# Patient Record
Sex: FEMALE | Race: WHITE | Employment: OTHER | URBAN - METROPOLITAN AREA
[De-identification: names, ages, dates, MRNs, and addresses within clinical notes are randomized per-mention and may not be internally consistent; named-entity substitution may affect disease eponyms.]

---

## 2017-12-21 ENCOUNTER — HOSPITAL ENCOUNTER (OUTPATIENT)
Age: 72
Discharge: HOME | End: 2017-12-21
Payer: MEDICARE

## 2022-10-10 ENCOUNTER — OFFICE VISIT (OUTPATIENT)
Dept: UROLOGY | Age: 77
End: 2022-10-10
Payer: MEDICARE

## 2022-10-10 VITALS
RESPIRATION RATE: 16 BRPM | BODY MASS INDEX: 36.63 KG/M2 | DIASTOLIC BLOOD PRESSURE: 72 MMHG | WEIGHT: 194 LBS | SYSTOLIC BLOOD PRESSURE: 138 MMHG | OXYGEN SATURATION: 98 % | HEART RATE: 68 BPM | HEIGHT: 61 IN

## 2022-10-10 DIAGNOSIS — N28.9 KIDNEY DISEASE: Primary | ICD-10-CM

## 2022-10-10 PROCEDURE — 99203 OFFICE O/P NEW LOW 30 MIN: CPT | Performed by: UROLOGY

## 2022-10-10 PROCEDURE — 99212 OFFICE O/P EST SF 10 MIN: CPT | Performed by: UROLOGY

## 2022-10-10 PROCEDURE — 1123F ACP DISCUSS/DSCN MKR DOCD: CPT | Performed by: UROLOGY

## 2022-10-10 RX ORDER — ATORVASTATIN CALCIUM 40 MG/1
TABLET, FILM COATED ORAL
COMMUNITY

## 2022-10-10 RX ORDER — CANDESARTAN 16 MG/1
TABLET ORAL
COMMUNITY
Start: 2022-05-12

## 2022-10-10 RX ORDER — METOPROLOL TARTRATE 50 MG/1
50 TABLET, FILM COATED ORAL 2 TIMES DAILY
COMMUNITY
Start: 2021-12-09

## 2022-10-10 RX ORDER — LEVOTHYROXINE SODIUM 0.12 MG/1
TABLET ORAL
COMMUNITY

## 2022-10-10 NOTE — PROGRESS NOTES
Emmie Morton MD.    [place] [facility] [employer]  [address]  [address]  [place] [address]  Dept: [phone]  Dept Fax: [phone]    [facility] Urology Office Note -     Patient:  Carlos Dumas  YOB: 1945    The patient is a 69 y.o. female who presents today for evaluation of the following problems:   Chief Complaint   Patient presents with    New Patient    Chronic Kidney Disease    referred/consultation requested by Clifford Brooks MD.    History of Present Illness:    CKD  Pt has not seen nephrologist  No flank pain currently  No hx of kidney stones  No UTI's  No hematuria        Requested/reviewed records from Clifford Brooks MD office and/or outside physician/EMR    (Patient's old records have been requested, reviewed and pertinent findings summarized in today's note.)    Procedures Today: N/A      Last several PSA's:  No results found for: PSA    Last total testosterone:  No results found for: TESTOSTERONE    Urinalysis today:  No results found for this visit on 10/10/22. Last BUN and creatinine:  No results found for: BUN  No results found for: CREATININE      Imaging Reviewed during this Office Visit:   David Aburto MD independently reviewed the images and verified the radiology reports from:    No results found. PAST MEDICAL, FAMILY AND SOCIAL HISTORY:  Past Medical History:   Diagnosis Date    Hyperthyroidism     thyroid removed at age 26     Past Surgical History:   Procedure Laterality Date    BREAST BIOPSY      THYROID SURGERY      thyroid removed     No family history on file.   Outpatient Medications Marked as Taking for the 10/10/22 encounter (Office Visit) with Vazquez Catherine MD   Medication Sig Dispense Refill    metoprolol tartrate (LOPRESSOR) 50 MG tablet Take 50 mg by mouth 2 times daily      atorvastatin (LIPITOR) 40 MG tablet       candesartan (ATACAND) 16 MG tablet TAKE 1 TABLET DAILY      levothyroxine (SYNTHROID) 125 MCG tablet       CRANBERRY PO Take by mouth daily      CALCIUM PO Take by mouth daily      ASPIRIN 81 PO       Omega-3 Fatty Acids (FISH OIL PO)       MAGNESIUM GLYCINATE PO       Multiple Vitamins-Minerals (V-C FORTE PO)       albuterol (PROVENTIL HFA) 108 (90 BASE) MCG/ACT inhaler Inhale 2 puffs into the lungs every 6 hours as needed for Wheezing or Shortness of Breath. 1 Inhaler 3       Pcn [penicillins]  Social History     Tobacco Use   Smoking Status Never   Smokeless Tobacco Never      (If patient a smoker, smoking cessation counseling offered)   Social History     Substance and Sexual Activity   Alcohol Use None       REVIEW OF SYSTEMS:  Constitutional: negative  Eyes: negative  Respiratory: negative  Cardiovascular: negative  Gastrointestinal: negative  Genitourinary: see HPI  Musculoskeletal: negative  Skin: negative   Neurological: negative  Hematological/Lymphatic: negative  Psychological: negative        Physical Exam:    This a 69 y.o. female  Vitals:    10/10/22 0854   BP: 138/72   Pulse: 68   Resp: 16   SpO2: 98%     Body mass index is 36.66 kg/m². Constitutional: Patient in no acute distress;       Assessment and Plan        1. Kidney disease               Plan:      Recommend renal u/s to rule out hydronephrosis  UA with micro  Needs referral to nephrology asap    PRN      Prescriptions Ordered:  No orders of the defined types were placed in this encounter. Orders Placed:  No orders of the defined types were placed in this encounter.            Karena Sen MD

## 2022-10-14 ENCOUNTER — HOSPITAL ENCOUNTER (OUTPATIENT)
Dept: ULTRASOUND IMAGING | Age: 77
Discharge: HOME | End: 2022-10-16
Payer: MEDICARE

## 2022-10-14 DIAGNOSIS — N28.9 KIDNEY DISEASE: ICD-10-CM

## 2022-10-14 PROCEDURE — 76775 US EXAM ABDO BACK WALL LIM: CPT

## 2022-10-18 ENCOUNTER — PROCEDURE VISIT (OUTPATIENT)
Dept: SURGERY | Age: 77
End: 2022-10-18
Payer: MEDICARE

## 2022-10-18 VITALS
HEART RATE: 84 BPM | WEIGHT: 198 LBS | DIASTOLIC BLOOD PRESSURE: 82 MMHG | HEIGHT: 61 IN | BODY MASS INDEX: 37.38 KG/M2 | SYSTOLIC BLOOD PRESSURE: 132 MMHG

## 2022-10-18 DIAGNOSIS — R22.32 MASS OF LEFT HAND: Primary | ICD-10-CM

## 2022-10-18 PROCEDURE — 99203 OFFICE O/P NEW LOW 30 MIN: CPT | Performed by: SURGERY

## 2022-10-18 PROCEDURE — 99212 OFFICE O/P EST SF 10 MIN: CPT | Performed by: SURGERY

## 2022-10-18 PROCEDURE — 1123F ACP DISCUSS/DSCN MKR DOCD: CPT | Performed by: SURGERY

## 2022-10-18 NOTE — PROGRESS NOTES
Deana Browning is a 69 y.o. female who presents today for a 3 to 4-month history of a new swelling/mass of the palm of the left hand. Patient reports that over the past 3 to 4 months she has noticed that she developed a very small what she thought was a cyst in the skin of the palm of the left hand. Initially it was not causing her any issues but over this past 3 to 4 months it seems that the initial lesion has increased in size and now she has developed a similar lesion just proximal to the initial lesion. She states that it is hard and is palpable. She states that she initially noticed it just by feeling her hand but over the past 3 to 4 months she has begun to notice that she does have a little pressure when she tries to fully extend the fingers of that hand. Has not noticed any inability to fully extend her fingers and has not noticed any catching or inability to flex or extend the fingers of the hand. She was seen by her PCP and was told that this may be a ganglion cyst and was given the option to see a surgeon or dermatology. The patient decided to see a surgeon and she was referred to me. On further questioning denies any prior issues. Has not noticed any redness, drainage from the skin in this area or other signs of infection.     Past Medical History:   Diagnosis Date    Hyperthyroidism     thyroid removed at age 26       Past Surgical History:   Procedure Laterality Date    BREAST BIOPSY      THYROID SURGERY      thyroid removed       Current Outpatient Medications   Medication Sig Dispense Refill    metoprolol tartrate (LOPRESSOR) 50 MG tablet Take 50 mg by mouth 2 times daily      atorvastatin (LIPITOR) 40 MG tablet       candesartan (ATACAND) 16 MG tablet TAKE 1 TABLET DAILY      levothyroxine (SYNTHROID) 125 MCG tablet       CRANBERRY PO Take by mouth daily      CALCIUM PO Take by mouth daily      ASPIRIN 81 PO       Omega-3 Fatty Acids (FISH OIL PO)       MAGNESIUM GLYCINATE PO Multiple Vitamins-Minerals (V-C FORTE PO)        No current facility-administered medications for this visit. Allergies   Allergen Reactions    Pcn [Penicillins] Rash       History reviewed. No pertinent family history. Social History     Socioeconomic History    Marital status:      Spouse name: Not on file    Number of children: Not on file    Years of education: Not on file    Highest education level: Not on file   Occupational History    Not on file   Tobacco Use    Smoking status: Never    Smokeless tobacco: Never   Substance and Sexual Activity    Alcohol use: Not on file    Drug use: No    Sexual activity: Not on file   Other Topics Concern    Not on file   Social History Narrative    Not on file     Social Determinants of Health     Financial Resource Strain: Not on file   Food Insecurity: Not on file   Transportation Needs: Not on file   Physical Activity: Not on file   Stress: Not on file   Social Connections: Not on file   Intimate Partner Violence: Not on file   Housing Stability: Not on file       ROS:   Review of Systems - General ROS: negative  Psychological ROS: negative  Ophthalmic ROS: negative  ENT ROS: negative  Respiratory ROS: no cough, shortness of breath, or wheezing  Cardiovascular ROS: no chest pain or dyspnea on exertion  Gastrointestinal ROS: no abdominal pain, change in bowel habits, or black or bloody stools  Genito-Urinary ROS: no dysuria, trouble voiding, or hematuria  Musculoskeletal ROS: negative  Dermatological ROS: negative      Objective   Vitals:    10/18/22 0823   BP: 132/82   Pulse: 84     General:in no apparent distress and well developed and well nourished  Eyes: No gross abnormalities. Ears, Nose, Throat: hearing grossly normal bilaterally  Neck: neck supple and non tender without mass  Lungs: clear to auscultation without wheezes or rales   Heart: S1S2, no mumurs, RRR  Abdomen: soft, nontender, no HSM, no guarding, no rebound, no masses  Extremity:  In the palm of the left hand proximal to the MCP joint of the fourth digit there is a palpable knot that seems to involve the skin as well as some deeper structures and it almost appears that the skin in that area has been bunched and scarred in the place. Does have the appearance of possible Dupuytren's contracture. Is able to move the fourth digit in full motion with both passive and active flexion and extension. She does report with passive and active extension of the fourth digit that she does feel some pulling in the area of the swelling. There is no catching during range of motion to suggest trigger finger. Neuro: CN II-XII grossly intact      1. Mass of left hand  Assessment & Plan: This point based on exam and the history I have concerned that this may represent Dupuytren's contracture versus a small cyst in a tendon of the palm. I think Dupuytren's contracture is more likely just based on the appearance. I discussed my concerns with the patient and she voices understanding. Unfortunately I do not offer hand surgery and do not treat Dupuytren's contracture. I have offered to make referral to hand specialist for further evaluation. She is willing to proceed with that referral.  Referral placed. May follow-up with PCP and the hand surgeon. No further follow-up with general surgery needed.   Orders:  -     Jed Costa, Allyssa Naranjo MD, Orthopedic Surgery, [place]         (Please note that portions of this note were completed with a voice recognition program.  Efforts were made to edit the dictations but occasionally words are mis-transcribed.)

## 2022-10-18 NOTE — ASSESSMENT & PLAN NOTE
This point based on exam and the history I have concerned that this may represent Dupuytren's contracture versus a small cyst in a tendon of the palm. I think Dupuytren's contracture is more likely just based on the appearance. I discussed my concerns with the patient and she voices understanding. Unfortunately I do not offer hand surgery and do not treat Dupuytren's contracture. I have offered to make referral to hand specialist for further evaluation. She is willing to proceed with that referral.  Referral placed. May follow-up with PCP and the hand surgeon. No further follow-up with general surgery needed.

## 2022-10-21 DIAGNOSIS — N28.9 KIDNEY DISEASE: Primary | ICD-10-CM

## 2022-11-02 DIAGNOSIS — M79.642 LEFT HAND PAIN: Primary | ICD-10-CM

## 2022-11-09 ENCOUNTER — OFFICE VISIT (OUTPATIENT)
Dept: ORTHOPEDIC SURGERY | Age: 77
End: 2022-11-09
Payer: MEDICARE

## 2022-11-09 ENCOUNTER — HOSPITAL ENCOUNTER (OUTPATIENT)
Dept: GENERAL RADIOLOGY | Age: 77
Discharge: HOME | End: 2022-11-11
Payer: MEDICARE

## 2022-11-09 VITALS
SYSTOLIC BLOOD PRESSURE: 136 MMHG | BODY MASS INDEX: 38.87 KG/M2 | HEART RATE: 57 BPM | WEIGHT: 198 LBS | DIASTOLIC BLOOD PRESSURE: 76 MMHG | OXYGEN SATURATION: 96 % | HEIGHT: 60 IN

## 2022-11-09 DIAGNOSIS — M79.642 LEFT HAND PAIN: ICD-10-CM

## 2022-11-09 DIAGNOSIS — R22.32 MASS OF LEFT HAND: ICD-10-CM

## 2022-11-09 DIAGNOSIS — M79.642 LEFT HAND PAIN: Primary | ICD-10-CM

## 2022-11-09 PROCEDURE — 73130 X-RAY EXAM OF HAND: CPT

## 2022-11-09 PROCEDURE — 99214 OFFICE O/P EST MOD 30 MIN: CPT | Performed by: ORTHOPAEDIC SURGERY

## 2022-11-09 PROCEDURE — 1123F ACP DISCUSS/DSCN MKR DOCD: CPT | Performed by: ORTHOPAEDIC SURGERY

## 2022-11-09 PROCEDURE — 99203 OFFICE O/P NEW LOW 30 MIN: CPT | Performed by: ORTHOPAEDIC SURGERY

## 2022-11-09 NOTE — PROGRESS NOTES
History and Physical    Patient's Name/Date of Birth: Fredo Riddle / 1945, 69 y.o. yo    Date: November 9, 2022     PCP: Isai Koroma, JAMES, APRN - CNP     History of Present Illness: This 79-year-old right-hand-dominant female presents with several month history of a pulling sensation to her left ring finger with no associated significant pain or discomfort. She reports no history of injury or trauma. Family history negative    Past Medical History:   Diagnosis Date    Hyperthyroidism     thyroid removed at age 26      Past Surgical History:   Procedure Laterality Date    BREAST BIOPSY      THYROID SURGERY      thyroid removed      Allergies: Pcn [penicillins]     Current Outpatient Medications   Medication Sig Dispense Refill    Bioflavonoid Products (BIOFLEX PO) Take by mouth 2 tablets in am      metoprolol tartrate (LOPRESSOR) 50 MG tablet Take 50 mg by mouth 2 times daily      atorvastatin (LIPITOR) 40 MG tablet       candesartan (ATACAND) 16 MG tablet TAKE 1 TABLET DAILY      levothyroxine (SYNTHROID) 125 MCG tablet       CRANBERRY PO Take by mouth daily      CALCIUM PO Take by mouth daily      ASPIRIN 81 PO       Omega-3 Fatty Acids (FISH OIL PO)       MAGNESIUM GLYCINATE PO       Multiple Vitamins-Minerals (V-C FORTE PO)        No current facility-administered medications for this visit. Social History     Tobacco Use    Smoking status: Never    Smokeless tobacco: Never   Substance Use Topics    Alcohol use: Not on file        Review of Systems:  Negative  Other than stated above in the HPI is negative      Physical exam:     General appearance: no acute distress  Head: NAD  Neck: supple  Lungs: No audible wheezing, respiratory rate normal  Heart: Perfusion to all extremeties  Extremities: Examination of her left hand revealed thickening fascia along the course of the left ring finger. She was nontender on palpation. She demonstrated full motion of the digit.   She did not have any associated contractures. She was assessed to be neurovascular intact. Radiographs have been obtained and reviewed noting no underlying acute or chronic pathology. Assessment:  Patient presents with early signs of Dupuytren's disease to the left palm. No associated contracture. Plan:  Recommendation at this time was expectant management. I did indicate the patient had progressive contractures she may be seen in my clinic at [facility] for further evaluation and treatment. No orders of the defined types were placed in this encounter.               Radha Emerson MD,MD 11/9/2022 at 12:43 PM

## 2022-11-29 ENCOUNTER — HOSPITAL ENCOUNTER (OUTPATIENT)
Dept: ULTRASOUND IMAGING | Age: 77
Discharge: HOME | End: 2022-12-01

## 2022-11-29 DIAGNOSIS — N28.9 KIDNEY DISEASE: ICD-10-CM

## 2022-12-05 ENCOUNTER — OFFICE VISIT (OUTPATIENT)
Dept: UROLOGY | Age: 77
End: 2022-12-05
Payer: MEDICARE

## 2022-12-05 VITALS
BODY MASS INDEX: 39.46 KG/M2 | WEIGHT: 201 LBS | OXYGEN SATURATION: 97 % | DIASTOLIC BLOOD PRESSURE: 64 MMHG | SYSTOLIC BLOOD PRESSURE: 122 MMHG | HEART RATE: 59 BPM | HEIGHT: 60 IN | RESPIRATION RATE: 16 BRPM

## 2022-12-05 DIAGNOSIS — N13.30 BILATERAL HYDRONEPHROSIS: Primary | ICD-10-CM

## 2022-12-05 PROCEDURE — 99213 OFFICE O/P EST LOW 20 MIN: CPT | Performed by: UROLOGY

## 2022-12-05 PROCEDURE — 1123F ACP DISCUSS/DSCN MKR DOCD: CPT | Performed by: UROLOGY

## 2022-12-05 NOTE — PROGRESS NOTES
Kasia Herr MD.    [place] [facility]  [address]  [address] [id number]  Dept: [phone]  Dept Fax: [phone]    [facility] Urology Office Note -     Patient:  Chelly Morfin  YOB: 1945    The patient is a 69 y.o. female who presents today for evaluation of the following problems:   Chief Complaint   Patient presents with    Chronic Kidney Disease     Review US- mild bilateral hydronephrosis, possible kidney stone left? referred/consultation requested by JAMES Guerra, APRN - CNP. History of Present Illness:    CKD  Pt has not seen nephrologist  No flank pain currently  No hx of kidney stones  No UTI's  No hematuria  Renal u/s mild bl hydronephrosis        Requested/reviewed records from JAMES Guerra, APRN - CNP office and/or outside physician/EMR    (Patient's old records have been requested, reviewed and pertinent findings summarized in today's note.)    Procedures Today: N/A      Last several PSA's:  No results found for: PSA    Last total testosterone:  No results found for: TESTOSTERONE    Urinalysis today:  No results found for this visit on 12/05/22. Last BUN and creatinine:  No results found for: BUN  No results found for: CREATININE      Imaging Reviewed during this Office Visit:   imaging independently reviewed by Caitie Topete MD and radiology report verified demonstrating     No results found. PAST MEDICAL, FAMILY AND SOCIAL HISTORY:  Past Medical History:   Diagnosis Date    Hyperthyroidism     thyroid removed at age 26     Past Surgical History:   Procedure Laterality Date    BREAST BIOPSY      THYROID SURGERY      thyroid removed     No family history on file.   Outpatient Medications Marked as Taking for the 12/5/22 encounter (Office Visit) with Guera Reynaga MD   Medication Sig [address] [facility] PO) Take by mouth 2 tablets in am      metoprolol tartrate (LOPRESSOR) 50 MG tablet Take 50 mg by mouth 2 times daily      atorvastatin (LIPITOR) 40 MG tablet       candesartan (ATACAND) 16 MG tablet TAKE 1 TABLET DAILY      levothyroxine (SYNTHROID) 125 MCG tablet       CRANBERRY PO Take by mouth daily      CALCIUM PO Take by mouth daily      ASPIRIN 81 PO       Omega-3 Fatty Acids (FISH OIL PO)       MAGNESIUM GLYCINATE PO       Multiple Vitamins-Minerals (V-C FORTE PO)          Pcn [penicillins]  Social History     Tobacco Use   Smoking Status Never   Smokeless Tobacco Never      (If patient a smoker, smoking cessation counseling offered)   Social History     Substance and Sexual Activity   Alcohol Use None       REVIEW OF SYSTEMS:  Constitutional: negative  Eyes: negative  Respiratory: negative  Cardiovascular: negative  Gastrointestinal: negative  Genitourinary: see HPI  Musculoskeletal: negative  Skin: negative   Neurological: negative  Hematological/Lymphatic: negative  Psychological: negative        Physical Exam:    This a 69 y.o. female  Vitals:    12/05/22 1133   BP: 122/64   Pulse: 59   Resp: 16   SpO2: 97%     Body mass index is 39.26 kg/m². Constitutional: Patient in no acute distress;       Assessment and Plan        1. Bilateral hydronephrosis                 Plan:      Bilateral hydronephrosis- repeat u/s in 2-3 months to assess for resolution. Currently asymptomatic. Kidney stone- small nonobstructing. Prescriptions Ordered:  No orders of the defined types were placed in this encounter. Orders Placed:  No orders of the defined types were placed in this encounter.            Court Rogers MD

## 2023-03-22 ENCOUNTER — HOSPITAL ENCOUNTER (OUTPATIENT)
Age: 78
Discharge: HOME | End: 2023-03-22
Payer: MEDICARE

## 2023-03-22 ENCOUNTER — OFFICE VISIT (OUTPATIENT)
Dept: NEPHROLOGY | Age: 78
End: 2023-03-22
Payer: MEDICARE

## 2023-03-22 VITALS
HEART RATE: 60 BPM | DIASTOLIC BLOOD PRESSURE: 78 MMHG | BODY MASS INDEX: 38.48 KG/M2 | WEIGHT: 196 LBS | SYSTOLIC BLOOD PRESSURE: 128 MMHG | HEIGHT: 60 IN

## 2023-03-22 DIAGNOSIS — N18.31 STAGE 3A CHRONIC KIDNEY DISEASE (HCC): Primary | ICD-10-CM

## 2023-03-22 DIAGNOSIS — I10 HYPERTENSION, UNSPECIFIED TYPE: ICD-10-CM

## 2023-03-22 DIAGNOSIS — N13.30 BILATERAL HYDRONEPHROSIS: ICD-10-CM

## 2023-03-22 DIAGNOSIS — N20.0 NEPHROLITHIASIS: ICD-10-CM

## 2023-03-22 DIAGNOSIS — N18.31 STAGE 3A CHRONIC KIDNEY DISEASE (HCC): ICD-10-CM

## 2023-03-22 LAB
BACTERIA: ABNORMAL
BILIRUBIN URINE: NEGATIVE
COLOR: YELLOW
COMPLEMENT C3: 185 MG/DL (ref 90–180)
COMPLEMENT C4: 32 MG/DL (ref 10–40)
CREATININE URINE: 218.3 MG/DL (ref 28–217)
EPITHELIAL CELLS UA: ABNORMAL /HPF (ref 0–5)
FREE KAPPA/LAMBDA RATIO: 0.96 (ref 0.26–1.65)
GLUCOSE UR STRIP.AUTO-MCNC: ABNORMAL MG/DL
KAPPA LC FREE SER-MCNC: 1.17 MG/DL (ref 0.37–1.94)
KETONES UR STRIP.AUTO-MCNC: NEGATIVE MG/DL
LAMBDA LC FREE SERPL-MCNC: 1.22 MG/DL (ref 0.57–2.63)
LEUKOCYTE ESTERASE UR QL STRIP.AUTO: ABNORMAL
MICROALBUMIN/CREAT 24H UR: 242 MG/L
MICROALBUMIN/CREAT UR-RTO: 111 MCG/MG CREAT
NITRITE UR QL STRIP.AUTO: NEGATIVE
PROT UR STRIP.AUTO-MCNC: 6 MG/DL (ref 5–6)
PROT UR STRIP.AUTO-MCNC: ABNORMAL MG/DL
RBC CLUMPS #/AREA URNS AUTO: ABNORMAL /HPF (ref 0–4)
SPECIFIC GRAVITY UA: 1.02 (ref 1.01–1.02)
TURBIDITY: ABNORMAL
URINE HGB: ABNORMAL
UROBILINOGEN, URINE: NORMAL
WBC UA: ABNORMAL /HPF (ref 0–4)

## 2023-03-22 PROCEDURE — 87088 URINE BACTERIA CULTURE: CPT

## 2023-03-22 PROCEDURE — 3078F DIAST BP <80 MM HG: CPT | Performed by: INTERNAL MEDICINE

## 2023-03-22 PROCEDURE — 86225 DNA ANTIBODY NATIVE: CPT

## 2023-03-22 PROCEDURE — 83521 IG LIGHT CHAINS FREE EACH: CPT

## 2023-03-22 PROCEDURE — 83516 IMMUNOASSAY NONANTIBODY: CPT

## 2023-03-22 PROCEDURE — 84155 ASSAY OF PROTEIN SERUM: CPT

## 2023-03-22 PROCEDURE — 99204 OFFICE O/P NEW MOD 45 MIN: CPT | Performed by: INTERNAL MEDICINE

## 2023-03-22 PROCEDURE — 82570 ASSAY OF URINE CREATININE: CPT

## 2023-03-22 PROCEDURE — 86334 IMMUNOFIX E-PHORESIS SERUM: CPT

## 2023-03-22 PROCEDURE — G8427 DOCREV CUR MEDS BY ELIG CLIN: HCPCS | Performed by: INTERNAL MEDICINE

## 2023-03-22 PROCEDURE — G8417 CALC BMI ABV UP PARAM F/U: HCPCS | Performed by: INTERNAL MEDICINE

## 2023-03-22 PROCEDURE — 1090F PRES/ABSN URINE INCON ASSESS: CPT | Performed by: INTERNAL MEDICINE

## 2023-03-22 PROCEDURE — 86038 ANTINUCLEAR ANTIBODIES: CPT

## 2023-03-22 PROCEDURE — G8400 PT W/DXA NO RESULTS DOC: HCPCS | Performed by: INTERNAL MEDICINE

## 2023-03-22 PROCEDURE — 82088 ASSAY OF ALDOSTERONE: CPT

## 2023-03-22 PROCEDURE — 1036F TOBACCO NON-USER: CPT | Performed by: INTERNAL MEDICINE

## 2023-03-22 PROCEDURE — 82043 UR ALBUMIN QUANTITATIVE: CPT

## 2023-03-22 PROCEDURE — G8484 FLU IMMUNIZE NO ADMIN: HCPCS | Performed by: INTERNAL MEDICINE

## 2023-03-22 PROCEDURE — 36415 COLL VENOUS BLD VENIPUNCTURE: CPT

## 2023-03-22 PROCEDURE — 87086 URINE CULTURE/COLONY COUNT: CPT

## 2023-03-22 PROCEDURE — 87186 SC STD MICRODIL/AGAR DIL: CPT

## 2023-03-22 PROCEDURE — 81001 URINALYSIS AUTO W/SCOPE: CPT

## 2023-03-22 PROCEDURE — 86160 COMPLEMENT ANTIGEN: CPT

## 2023-03-22 PROCEDURE — 84244 ASSAY OF RENIN: CPT

## 2023-03-22 PROCEDURE — 1123F ACP DISCUSS/DSCN MKR DOCD: CPT | Performed by: INTERNAL MEDICINE

## 2023-03-22 PROCEDURE — 3074F SYST BP LT 130 MM HG: CPT | Performed by: INTERNAL MEDICINE

## 2023-03-22 PROCEDURE — 84165 PROTEIN E-PHORESIS SERUM: CPT

## 2023-03-22 NOTE — PROGRESS NOTES
hours.  BNP:    No results found for: BNP  NEVIN:    No results found for: NEVIN  SPEP:No results found for: PROT, ALBCAL, ALBCAL, ALBPCT, LABALPH, A1PCT, LABALPH, A2PCT, LABBETA, BETAPCT, GAMGLOB, GGPCT, PATH  UPEP:   No results found for: LABPE  C3:   No results found for: C3  C4:   No results found for: C4  MPO ANCA:   No results found for: MPO  PR3 ANCA:   No results found for: PR3  Anti-GBM:   No results found for: GBMABIGG  Hep BsAg:       No results found for: HEPBSAG  Hep C AB:        No results found for: HEPCAB    Urinalysis/Chemistries:    No results found for: NITRU, COLORU, PHUR, LABCAST, WBCUA, RBCUA, MUCUS, TRICHOMONAS, YEAST, BACTERIA, CLARITYU, SPECGRAV, LEUKOCYTESUR, UROBILINOGEN, BILIRUBINUR, BLOODU, GLUCOSEU, KETUA, AMORPHOUS  Urine Sodium:   No results found for: KATIE  Urine Potassium:  No results found for: KUR  Urine Chloride:  No results found for: CLUR  Urine Osmolarity: No results found for: OSMOU  Urine Protein:   No results found for: TPU  Urine Creatinine:   No results found for: LABCREA  UPC:     Urine Eosinophils:  No components found for: UEOS    Radiology:     CXR:     Assessment:     1. Chronic kidney disease stage IIIa appears to be present for at least the last couple of years. Latest lab work 12/7/2022 showed normal electrolytes and BUN 19 and creatinine 1.12 for estimated GFR of 51 mL/min. Of note, the patient has been on candesartan for at least the last 6 to 8 years per patient documentation and that may have contributed to a rising creatinine that has persisted over time. Other potential etiologies such as paraprotein disease vasculitides connective tissue disease and glomerulonephritis need to undergo evaluation. 2.  Hypertension with no previous evaluation for secondary causes  3. History of a normal CBC in September 2022  4. Reported history of a heart valve leak by the patient and sees Ignacio Rojas CNP for cardiology  5.   Mild bilateral hydronephrosis with a

## 2023-03-24 LAB
ALBUMIN (CALCULATED): 4.2 G/DL (ref 3.2–5.2)
ALBUMIN PERCENT: 62 % (ref 45–65)
ALPHA 1 PERCENT: 2 % (ref 3–6)
ALPHA 2 PERCENT: 14 % (ref 6–13)
ALPHA1 GLOB SERPL ELPH-MCNC: 0.2 G/DL (ref 0.1–0.4)
ALPHA2 GLOB SERPL ELPH-MCNC: 0.9 G/DL (ref 0.5–0.9)
ANA SER QL IA: NEGATIVE
ANCA MYELOPEROXIDASE: <0.3 AU/ML (ref 0–3.5)
ANCA PROTEINASE 3: <0.7 AU/ML (ref 0–2)
B-GLOBULIN SERPL ELPH-MCNC: 0.7 G/DL (ref 0.5–1.1)
BETA PERCENT: 11 % (ref 11–19)
DSDNA IGG SER QL IA: <0.5 IU/ML
GAMMA GLOB SERPL ELPH-MCNC: 0.7 G/DL (ref 0.5–1.5)
GAMMA GLOBULIN %: 11 % (ref 9–20)
MICROORGANISM SPEC CULT: ABNORMAL
NUCLEAR IGG SER IA-RTO: 0.6 U/ML
PATHOLOGIST: ABNORMAL
PATHOLOGIST: NORMAL
PROT SERPL-MCNC: 6.7 G/DL (ref 6.4–8.3)
PROTEIN ELECTROPHORESIS, SERUM: ABNORMAL
SERUM IFX INTERP: NORMAL
SPECIMEN DESCRIPTION: ABNORMAL
TOTAL PROT. SUM,%: 100 % (ref 98–102)
TOTAL PROT. SUM: 6.7 G/DL (ref 6.3–8.2)

## 2023-03-25 LAB
ALDOSTERONE COMMENT: NORMAL
ALDOSTERONE: 17.5 NG/DL

## 2023-03-26 LAB
RENIN ACTIVITY: 13 NG/ML/HR
RENIN COMMENT: NORMAL

## 2023-04-03 ENCOUNTER — HOSPITAL ENCOUNTER (OUTPATIENT)
Dept: INTERVENTIONAL RADIOLOGY/VASCULAR | Age: 78
Discharge: HOME | End: 2023-04-05
Payer: MEDICARE

## 2023-04-03 PROCEDURE — 76770 US EXAM ABDO BACK WALL COMP: CPT

## 2023-05-04 ENCOUNTER — HOSPITAL ENCOUNTER (OUTPATIENT)
Dept: CT IMAGING | Age: 78
Discharge: HOME | End: 2023-05-06
Payer: MEDICARE

## 2023-05-04 DIAGNOSIS — N13.30 BILATERAL HYDRONEPHROSIS: ICD-10-CM

## 2023-05-04 PROCEDURE — 74176 CT ABD & PELVIS W/O CONTRAST: CPT

## 2023-05-08 ENCOUNTER — TELEPHONE (OUTPATIENT)
Dept: UROLOGY | Age: 78
End: 2023-05-08

## 2023-05-08 NOTE — TELEPHONE ENCOUNTER
Samantha Ramirez phoned very apologetic for missing forgetting todays ajppointment. She was wondering if she at some point could do a virtual visit to discuss ct scan results that were to elissa jensen discussed today. She did mention she will see Dr. Kya Huber 9-. I did not reschedule her appointment at the time of the call. I told her a nurse would call her back later today.

## 2023-05-11 NOTE — PROGRESS NOTES
[place] [facility] [employer]  [address]  [address]  [place] [address]  Dept: [phone]    Visit Date: 5/12/2023        HPI:     Myra Baugh is a 69 y.o. female who presents today for:  Chief Complaint   Patient presents with    Kidney Problem     F/u hydronephrosis       HPI  Patient presents to urology clinic for kidney stone and CKD. CKD  No flank pain currently  No hx of kidney stones  No recent UTU, previous E. Coli  No hematuria  Renal u/s worsening bl hydronephrosis on recheck- left 1.7cm renal stone on CT    Current Outpatient Medications   Medication Sig Dispense Refill    Bioflavonoid Products (BIOFLEX PO) Take by mouth 2 tablets in am      metoprolol tartrate (LOPRESSOR) 50 MG tablet Take 1 tablet by mouth 2 times daily      atorvastatin (LIPITOR) 40 MG tablet       candesartan (ATACAND) 16 MG tablet TAKE 1 TABLET DAILY      levothyroxine (SYNTHROID) 125 MCG tablet       CRANBERRY PO Take by mouth daily      CALCIUM PO Take by mouth daily      ASPIRIN 81 PO       Omega-3 Fatty Acids (FISH OIL PO)       MAGNESIUM GLYCINATE PO       Multiple Vitamins-Minerals (V-C FORTE PO)        No current facility-administered medications for this visit. Past Medical History  Isac Cornelius  has a past medical history of Hyperthyroidism. Past Surgical History  The patient  has a past surgical history that includes Thyroid surgery and Breast biopsy. Family History  This patient's family history is not on file. Social History  Isac Cornelius  reports that she has never smoked. She has never used smokeless tobacco. She reports that she does not use drugs.       Subjective:      REVIEW OF SYSTEMS:  Constitutional: negative  Eyes: negative  Respiratory: negative  Cardiovascular: negative  Gastrointestinal: negative  Musculoskeletal: negative  Genitourinary: negative except for what is in HPI  Skin: negative

## 2023-05-12 ENCOUNTER — OFFICE VISIT (OUTPATIENT)
Dept: UROLOGY | Age: 78
End: 2023-05-12
Payer: MEDICARE

## 2023-05-12 VITALS
BODY MASS INDEX: 38.08 KG/M2 | HEIGHT: 60 IN | SYSTOLIC BLOOD PRESSURE: 132 MMHG | DIASTOLIC BLOOD PRESSURE: 78 MMHG | OXYGEN SATURATION: 97 % | HEART RATE: 60 BPM

## 2023-05-12 DIAGNOSIS — N20.0 NEPHROLITHIASIS: ICD-10-CM

## 2023-05-12 DIAGNOSIS — N13.30 BILATERAL HYDRONEPHROSIS: Primary | ICD-10-CM

## 2023-05-12 DIAGNOSIS — N28.9 KIDNEY DISEASE: ICD-10-CM

## 2023-05-12 PROCEDURE — G8400 PT W/DXA NO RESULTS DOC: HCPCS | Performed by: NURSE PRACTITIONER

## 2023-05-12 PROCEDURE — G8417 CALC BMI ABV UP PARAM F/U: HCPCS | Performed by: NURSE PRACTITIONER

## 2023-05-12 PROCEDURE — 1036F TOBACCO NON-USER: CPT | Performed by: NURSE PRACTITIONER

## 2023-05-12 PROCEDURE — 99214 OFFICE O/P EST MOD 30 MIN: CPT | Performed by: NURSE PRACTITIONER

## 2023-05-12 PROCEDURE — 1123F ACP DISCUSS/DSCN MKR DOCD: CPT | Performed by: NURSE PRACTITIONER

## 2023-05-12 PROCEDURE — 1090F PRES/ABSN URINE INCON ASSESS: CPT | Performed by: NURSE PRACTITIONER

## 2023-05-12 PROCEDURE — G8427 DOCREV CUR MEDS BY ELIG CLIN: HCPCS | Performed by: NURSE PRACTITIONER

## 2023-05-19 ENCOUNTER — TELEPHONE (OUTPATIENT)
Dept: UROLOGY | Age: 78
End: 2023-05-19

## 2023-05-19 DIAGNOSIS — Z01.818 PRE-OP TESTING: Primary | ICD-10-CM

## 2023-05-22 NOTE — TELEPHONE ENCOUNTER
Pre-op cxr and ekg orders placed. Pt. Was moved to 6/12/23. Patient voiced understanding will come in for testing.

## 2023-05-24 ENCOUNTER — HOSPITAL ENCOUNTER (OUTPATIENT)
Dept: NON INVASIVE DIAGNOSTICS | Age: 78
Discharge: HOME | End: 2023-05-24
Payer: MEDICARE

## 2023-05-24 ENCOUNTER — OFFICE VISIT (OUTPATIENT)
Dept: NEPHROLOGY | Age: 78
End: 2023-05-24
Payer: MEDICARE

## 2023-05-24 ENCOUNTER — HOSPITAL ENCOUNTER (OUTPATIENT)
Dept: GENERAL RADIOLOGY | Age: 78
Discharge: HOME | End: 2023-05-26
Payer: MEDICARE

## 2023-05-24 VITALS
HEIGHT: 60 IN | BODY MASS INDEX: 37.11 KG/M2 | HEART RATE: 64 BPM | SYSTOLIC BLOOD PRESSURE: 128 MMHG | WEIGHT: 189 LBS | DIASTOLIC BLOOD PRESSURE: 82 MMHG

## 2023-05-24 DIAGNOSIS — N20.0 NEPHROLITHIASIS: ICD-10-CM

## 2023-05-24 DIAGNOSIS — R80.9 MICROALBUMINURIA: ICD-10-CM

## 2023-05-24 DIAGNOSIS — I10 PRIMARY HYPERTENSION: ICD-10-CM

## 2023-05-24 DIAGNOSIS — Z01.818 PRE-OP TESTING: ICD-10-CM

## 2023-05-24 DIAGNOSIS — N18.31 STAGE 3A CHRONIC KIDNEY DISEASE (HCC): Primary | ICD-10-CM

## 2023-05-24 DIAGNOSIS — D89.2 PARAPROTEINEMIA: ICD-10-CM

## 2023-05-24 LAB
EKG ATRIAL RATE: 58 BPM
EKG P AXIS: 52 DEGREES
EKG P-R INTERVAL: 162 MS
EKG Q-T INTERVAL: 460 MS
EKG QRS DURATION: 132 MS
EKG QTC CALCULATION (BAZETT): 451 MS
EKG R AXIS: 96 DEGREES
EKG T AXIS: -26 DEGREES
EKG VENTRICULAR RATE: 58 BPM

## 2023-05-24 PROCEDURE — 1036F TOBACCO NON-USER: CPT | Performed by: INTERNAL MEDICINE

## 2023-05-24 PROCEDURE — G8427 DOCREV CUR MEDS BY ELIG CLIN: HCPCS | Performed by: INTERNAL MEDICINE

## 2023-05-24 PROCEDURE — G8400 PT W/DXA NO RESULTS DOC: HCPCS | Performed by: INTERNAL MEDICINE

## 2023-05-24 PROCEDURE — 1123F ACP DISCUSS/DSCN MKR DOCD: CPT | Performed by: INTERNAL MEDICINE

## 2023-05-24 PROCEDURE — 71045 X-RAY EXAM CHEST 1 VIEW: CPT

## 2023-05-24 PROCEDURE — 93005 ELECTROCARDIOGRAM TRACING: CPT

## 2023-05-24 PROCEDURE — 3074F SYST BP LT 130 MM HG: CPT | Performed by: INTERNAL MEDICINE

## 2023-05-24 PROCEDURE — 3078F DIAST BP <80 MM HG: CPT | Performed by: INTERNAL MEDICINE

## 2023-05-24 PROCEDURE — 1090F PRES/ABSN URINE INCON ASSESS: CPT | Performed by: INTERNAL MEDICINE

## 2023-05-24 PROCEDURE — 99214 OFFICE O/P EST MOD 30 MIN: CPT | Performed by: INTERNAL MEDICINE

## 2023-05-24 PROCEDURE — G8417 CALC BMI ABV UP PARAM F/U: HCPCS | Performed by: INTERNAL MEDICINE

## 2023-05-24 NOTE — PROGRESS NOTES
Renal Consult Note    Patient :  Bandar Zelaya; 69 y.o. MRN# [id number]    Reason for Consult:     Asked by Dr Marisol lundberg. providers found to see for JOSE/Elevated Creatinine. History of Present Illness:     Bandar Zelaya; 69 y.o. female with past medical history as mentioned below. She is at an elevated serum creatinine for at least the past couple of years. She does note that she had an uncontrolled hypertension episode about 6 to 8 years ago. She was started on medications for blood pressure by Dr. Leeanna Huntley. No significant change in medications since that time. The patient is on candesartan and metoprolol for her blood pressure control. It is also noteworthy that she sees Inge Dumas nurse practitioner for cardiology. She has had a history of a \"valve leak\". It is unclear what valve and to what extent. The nurse practitioner put her on fark [employer]. However, the patient had previously been been put on [address] for her kidney issue but stopped it because of a significant vaginal infection. I would say if the patient does get another infection of significance she might have to stop the [address] permanently. She has a past medical history of hypertension. She also has a history of bilateral hydronephrosis which is mild and a history of a small nonobstructing kidney stone. The evaluation for the hydronephrosis led to a referral to nephrology. She also had hyperthyroidism resulting in thyroid removal at the age of 26. She has had a history of bilateral tubal ligation. She says she has 3 children who are relatively healthy but have had some autoimmune issues. She had a daughter who had high thyroid and utilized iodine. Allergies are to penicillin which causes a rash. Medications include Farxiga, BIOflex, Lopressor, Lipitor, candesartan, Synthroid, oral cranberry, oral calcium, 81 mg aspirin, fish oil, magnesium and multivitamin.     Social history includes the patient is  with her  in

## 2023-06-06 ENCOUNTER — TELEPHONE (OUTPATIENT)
Dept: UROLOGY | Age: 78
End: 2023-06-06

## 2023-06-07 NOTE — TELEPHONE ENCOUNTER
Cardiac clearance scanned in under media.
(LOPRESSOR) 50 MG tablet Take 1 tablet by mouth 2 times daily 12/9/21   Historical Provider, MD   atorvastatin (LIPITOR) 40 MG tablet     Historical Provider, MD   candesartan (ATACAND) 16 MG tablet TAKE 1 TABLET DAILY 5/12/22   Historical Provider, MD   levothyroxine (SYNTHROID) 125 MCG tablet     Historical Provider, MD   CRANBERRY PO Take by mouth daily    Historical Provider, MD   CALCIUM PO Take by mouth daily    Historical Provider, MD   ASPIRIN 81 PO     Historical Provider, MD   Omega-3 Fatty Acids (FISH OIL PO)     Historical Provider, MD   MAGNESIUM GLYCINATE PO     Historical Provider, MD   Multiple Vitamins-Minerals (V-C FORTE PO)     Historical Provider, MD     Vital Signs (Current) [unfilled]    Weight:   Wt Readings from Last 1 Encounters:   05/24/23 189 lb (85.7 kg)     Height:   Ht Readings from Last 1 Encounters:   05/24/23 5' (1.524 m)      BMI:  There is no height or weight on file to calculate BMI. Estimated body mass index is 36.91 kg/m² as calculated from the following:    Height as of 5/24/23: 5' (1.524 m). Weight as of 5/24/23: 189 lb (85.7 kg). body mass index is unknown because there is no height or weight on file. Cardiac Clearance:  DALJIT Deshpande   Medical Clearance: Lc Chakraborty     Appointment for surgery Clearance scheduled for:None     Preoperative Testing: These are the current and completed labs:  CBC: No results found for: WBC, RBC, HGB, HCT, MCV, RDW, PLT  CMP:   Lab Results   Component Value Date/Time    PROT 6.7 03/22/2023 02:57 PM     POC Tests: No results for input(s): POCGLU, POCNA, POCK, POCCL, POCBUN, POCHEMO, POCHCT in the last 72 hours.   Coags  No results found for: PROTIME, INR, APTT  HCG (If Applicable) No results found for: PREGTESTUR, PREGSERUM, HCG, HCGQUANT   ABGs No results found for: PHART, PO2ART, TXM4BXC, IUT1HRO, BEART, G6MBIJXM   Type & Screen (If Applicable)  No results found for: LABABO, LABRH    Additional ordered pre-operative

## 2023-07-10 ENCOUNTER — PROCEDURE VISIT (OUTPATIENT)
Dept: UROLOGY | Age: 78
End: 2023-07-10
Payer: MEDICARE

## 2023-07-10 VITALS
WEIGHT: 198 LBS | BODY MASS INDEX: 38.87 KG/M2 | DIASTOLIC BLOOD PRESSURE: 72 MMHG | HEART RATE: 76 BPM | SYSTOLIC BLOOD PRESSURE: 136 MMHG | HEIGHT: 60 IN

## 2023-07-10 DIAGNOSIS — N20.0 NEPHROLITHIASIS: ICD-10-CM

## 2023-07-10 DIAGNOSIS — N13.30 BILATERAL HYDRONEPHROSIS: Primary | ICD-10-CM

## 2023-07-10 PROCEDURE — 52310 CYSTOSCOPY AND TREATMENT: CPT | Performed by: UROLOGY

## 2023-07-10 NOTE — PROGRESS NOTES
Cystoscopy with left stent removal    Operative Note    Patient:  Elaina Laws  MRN: [id number]  YOB: 1945    Date: 07/10/23  Surgeon: Waldo Dutton MD  Anesthesia: Urojet Local  Indications: kidney stone  Position: Dorsal Lithotomy  EBL: 0 ml    Findings:   The patient was prepped and draped in the usual sterile fashion. The cystoscope was advanced through the urethra and into the bladder. The bladder was thoroughly inspected and the following was noted:    Vagina: normal appearing vagina with normal color and discharge, no lesions  Residual Urine: mild. Urine clear, with no obvious infection  Urethra: normal appearing urethra with no masses, tenderness or lesions urethral hypermobility not noted  Bladder: no tumor noted . no bladder diverticulum. Mild trabeculation noted. Ureters: Orifices with normal configuration and location. left stent was removed with graspers in it's entirety    The cystoscope was removed. The patient tolerated the procedure well.   Flomax or ditropan could have caused a reaction  Pt did have fever after surgery  Family upset that pt was discharged too early    3 months litholink and kub w brock

## 2023-07-10 NOTE — PROGRESS NOTES
[address] [place] [address] E number [id number]; Serial Number [id number] scope #2 used for procedure today, was removed from sterile container after visual inspection.

## 2023-08-08 DIAGNOSIS — N20.0 NEPHROLITHIASIS: ICD-10-CM

## 2023-08-15 ENCOUNTER — HOSPITAL ENCOUNTER (OUTPATIENT)
Dept: GENERAL RADIOLOGY | Age: 78
Discharge: HOME | End: 2023-08-17
Payer: MEDICARE

## 2023-08-15 DIAGNOSIS — N20.0 NEPHROLITHIASIS: ICD-10-CM

## 2023-08-15 PROCEDURE — 74018 RADEX ABDOMEN 1 VIEW: CPT

## 2023-08-22 NOTE — PROGRESS NOTES
[facility] [address]  [address] [facility] [address]  Dept: [phone]    Visit Date: 8/25/2023        HPI:     Donald Medina is a 69 y.o. female who presents today for:  Chief Complaint   Patient presents with    Nephrolithiasis     1 month with kub and litholink,        HPI  Patient presents to urology clinic for post-operative follow-up. Catherine Ron is s/p cystoscopy, left ureteroscopy left holmium laser lithotripsy  left stent placement by Dr. Belinda Martinez on 6/12/23. She is doing very well. Denies flank pain, suprapubic pressure, gross hematuria, dysuria, fever or chills. Litholink reviewed- low urine citrate. KUB negative. No urinary urgency, frequency or incontinence reported. Previous records:  CKD  No flank pain currently  No hx of kidney stones  No recent UTU, previous E. Coli  No hematuria  Renal u/s worsening bl hydronephrosis on recheck- left 1.7cm renal stone on CT    Current Outpatient Medications   Medication Sig Dispense Refill    potassium citrate (UROCIT-K) 10 MEQ (1080 MG) extended release tablet Take 1 tablet by mouth 2 times daily (with meals) 180 tablet 4    Bioflavonoid Products (BIOFLEX PO) Take by mouth 2 tablets in am      metoprolol tartrate (LOPRESSOR) 50 MG tablet Take 1 tablet by mouth 2 times daily      atorvastatin (LIPITOR) 40 MG tablet       candesartan (ATACAND) 16 MG tablet TAKE 1 TABLET DAILY      levothyroxine (SYNTHROID) 125 MCG tablet       CRANBERRY PO Take by mouth daily      CALCIUM PO Take by mouth daily      ASPIRIN 81 PO       Omega-3 Fatty Acids (FISH OIL PO)       MAGNESIUM GLYCINATE PO       Multiple Vitamins-Minerals (V-C FORTE PO)        No current facility-administered medications for this visit. Past Medical History  Catherine Ron  has a past medical history of Hyperthyroidism.     Past Surgical History  The patient  has a past surgical history

## 2023-08-25 ENCOUNTER — OFFICE VISIT (OUTPATIENT)
Dept: UROLOGY | Age: 78
End: 2023-08-25
Payer: MEDICARE

## 2023-08-25 VITALS
HEIGHT: 60 IN | SYSTOLIC BLOOD PRESSURE: 108 MMHG | BODY MASS INDEX: 38.67 KG/M2 | HEART RATE: 59 BPM | DIASTOLIC BLOOD PRESSURE: 60 MMHG

## 2023-08-25 DIAGNOSIS — N20.0 NEPHROLITHIASIS: Primary | ICD-10-CM

## 2023-08-25 DIAGNOSIS — N13.30 BILATERAL HYDRONEPHROSIS: ICD-10-CM

## 2023-08-25 PROCEDURE — G8400 PT W/DXA NO RESULTS DOC: HCPCS | Performed by: NURSE PRACTITIONER

## 2023-08-25 PROCEDURE — 99213 OFFICE O/P EST LOW 20 MIN: CPT | Performed by: NURSE PRACTITIONER

## 2023-08-25 PROCEDURE — 1123F ACP DISCUSS/DSCN MKR DOCD: CPT | Performed by: NURSE PRACTITIONER

## 2023-08-25 PROCEDURE — 1090F PRES/ABSN URINE INCON ASSESS: CPT | Performed by: NURSE PRACTITIONER

## 2023-08-25 PROCEDURE — G8427 DOCREV CUR MEDS BY ELIG CLIN: HCPCS | Performed by: NURSE PRACTITIONER

## 2023-08-25 PROCEDURE — G8417 CALC BMI ABV UP PARAM F/U: HCPCS | Performed by: NURSE PRACTITIONER

## 2023-08-25 PROCEDURE — 1036F TOBACCO NON-USER: CPT | Performed by: NURSE PRACTITIONER

## 2023-08-25 RX ORDER — POTASSIUM CITRATE 10 MEQ/1
10 TABLET, EXTENDED RELEASE ORAL 2 TIMES DAILY WITH MEALS
Qty: 180 TABLET | Refills: 4 | Status: SHIPPED | OUTPATIENT
Start: 2023-08-25 | End: 2023-11-23

## 2023-10-30 ENCOUNTER — HOSPITAL ENCOUNTER (OUTPATIENT)
Age: 78
Discharge: HOME | End: 2023-10-30
Payer: MEDICARE

## 2023-10-30 DIAGNOSIS — I10 PRIMARY HYPERTENSION: ICD-10-CM

## 2023-10-30 DIAGNOSIS — D89.2 PARAPROTEINEMIA: ICD-10-CM

## 2023-10-30 DIAGNOSIS — N20.0 NEPHROLITHIASIS: ICD-10-CM

## 2023-10-30 DIAGNOSIS — N18.31 STAGE 3A CHRONIC KIDNEY DISEASE (HCC): ICD-10-CM

## 2023-10-30 LAB
ANION GAP SERPL CALCULATED.3IONS-SCNC: 7 MMOL/L (ref 9–17)
BUN SERPL-MCNC: 20 MG/DL (ref 8–23)
BUN/CREAT SERPL: 22 (ref 9–20)
CALCIUM SERPL-MCNC: 9.4 MG/DL (ref 8.6–10.4)
CHLORIDE SERPL-SCNC: 105 MMOL/L (ref 98–107)
CO2 SERPL-SCNC: 31 MMOL/L (ref 20–31)
CREAT SERPL-MCNC: 0.9 MG/DL (ref 0.5–0.9)
CREAT UR-MCNC: 107.5 MG/DL (ref 28–217)
ERYTHROCYTE [DISTWIDTH] IN BLOOD BY AUTOMATED COUNT: 14.2 % (ref 11.8–14.4)
GFR SERPL CREATININE-BSD FRML MDRD: >60 ML/MIN/1.73M2
GLUCOSE SERPL-MCNC: 99 MG/DL (ref 70–99)
HCT VFR BLD AUTO: 41.3 % (ref 36.3–47.1)
HGB BLD-MCNC: 12.8 G/DL (ref 11.9–15.1)
MCH RBC QN AUTO: 28.9 PG (ref 25.2–33.5)
MCHC RBC AUTO-ENTMCNC: 31 G/DL (ref 25.2–33.5)
MCV RBC AUTO: 93.2 FL (ref 82.6–102.9)
MICROALBUMIN UR-MCNC: 69 MG/L
MICROALBUMIN/CREAT UR-RTO: 64 MCG/MG CREAT
NRBC BLD-RTO: 0 PER 100 WBC
PLATELET # BLD AUTO: 253 K/UL (ref 138–453)
PMV BLD AUTO: 10.4 FL (ref 8.1–13.5)
POTASSIUM SERPL-SCNC: 4.9 MMOL/L (ref 3.7–5.3)
RBC # BLD AUTO: 4.43 M/UL (ref 3.95–5.11)
SODIUM SERPL-SCNC: 143 MMOL/L (ref 135–144)
WBC OTHER # BLD: 6.9 K/UL (ref 3.5–11.3)

## 2023-10-30 PROCEDURE — 82043 UR ALBUMIN QUANTITATIVE: CPT

## 2023-10-30 PROCEDURE — 84165 PROTEIN E-PHORESIS SERUM: CPT

## 2023-10-30 PROCEDURE — 82570 ASSAY OF URINE CREATININE: CPT

## 2023-10-30 PROCEDURE — 84155 ASSAY OF PROTEIN SERUM: CPT

## 2023-10-30 PROCEDURE — 85027 COMPLETE CBC AUTOMATED: CPT

## 2023-10-30 PROCEDURE — 86334 IMMUNOFIX E-PHORESIS SERUM: CPT

## 2023-10-30 PROCEDURE — 80048 BASIC METABOLIC PNL TOTAL CA: CPT

## 2023-10-30 PROCEDURE — 36415 COLL VENOUS BLD VENIPUNCTURE: CPT

## 2023-11-01 LAB
ALBUMIN PERCENT: 60 % (ref 45–65)
ALBUMIN SERPL-MCNC: 3.8 G/DL (ref 3.2–5.2)
ALPHA 2 PERCENT: 15 % (ref 6–13)
ALPHA1 GLOB SERPL ELPH-MCNC: 0.2 G/DL (ref 0.1–0.4)
ALPHA1 GLOB SERPL ELPH-MCNC: 3 % (ref 3–6)
ALPHA2 GLOB SERPL ELPH-MCNC: 0.9 G/DL (ref 0.5–0.9)
B-GLOBULIN SERPL ELPH-MCNC: 0.7 G/DL (ref 0.5–1.1)
B-GLOBULIN SERPL ELPH-MCNC: 10 % (ref 11–19)
GAMMA GLOB SERPL ELPH-MCNC: 0.8 G/DL (ref 0.5–1.5)
GAMMA GLOBULIN %: 12 % (ref 9–20)
INTERPRETATION SERPL IFE-IMP: NORMAL
PATH REV: NORMAL
PATHOLOGIST: ABNORMAL
PROT PATTERN SERPL ELPH-IMP: ABNORMAL
PROT SERPL-MCNC: 6.4 G/DL (ref 6.4–8.3)
TOTAL PROT. SUM,%: 100 % (ref 98–102)
TOTAL PROT. SUM: 6.4 G/DL (ref 6.3–8.2)

## 2023-11-22 ENCOUNTER — OFFICE VISIT (OUTPATIENT)
Dept: NEPHROLOGY | Age: 78
End: 2023-11-22
Payer: MEDICARE

## 2023-11-22 VITALS
WEIGHT: 180 LBS | HEART RATE: 58 BPM | HEIGHT: 60 IN | SYSTOLIC BLOOD PRESSURE: 126 MMHG | BODY MASS INDEX: 35.34 KG/M2 | DIASTOLIC BLOOD PRESSURE: 76 MMHG

## 2023-11-22 DIAGNOSIS — N20.0 NEPHROLITHIASIS: ICD-10-CM

## 2023-11-22 DIAGNOSIS — D89.2 PARAPROTEINEMIA: Primary | ICD-10-CM

## 2023-11-22 DIAGNOSIS — R80.9 MICROALBUMINURIA: ICD-10-CM

## 2023-11-22 DIAGNOSIS — N18.2 CKD (CHRONIC KIDNEY DISEASE), STAGE II: ICD-10-CM

## 2023-11-22 DIAGNOSIS — I10 PRIMARY HYPERTENSION: ICD-10-CM

## 2023-11-22 PROCEDURE — 3078F DIAST BP <80 MM HG: CPT | Performed by: INTERNAL MEDICINE

## 2023-11-22 PROCEDURE — G8427 DOCREV CUR MEDS BY ELIG CLIN: HCPCS | Performed by: INTERNAL MEDICINE

## 2023-11-22 PROCEDURE — 99212 OFFICE O/P EST SF 10 MIN: CPT | Performed by: INTERNAL MEDICINE

## 2023-11-22 PROCEDURE — 1036F TOBACCO NON-USER: CPT | Performed by: INTERNAL MEDICINE

## 2023-11-22 PROCEDURE — 1090F PRES/ABSN URINE INCON ASSESS: CPT | Performed by: INTERNAL MEDICINE

## 2023-11-22 PROCEDURE — G8400 PT W/DXA NO RESULTS DOC: HCPCS | Performed by: INTERNAL MEDICINE

## 2023-11-22 PROCEDURE — G8417 CALC BMI ABV UP PARAM F/U: HCPCS | Performed by: INTERNAL MEDICINE

## 2023-11-22 PROCEDURE — 99213 OFFICE O/P EST LOW 20 MIN: CPT | Performed by: INTERNAL MEDICINE

## 2023-11-22 PROCEDURE — 1123F ACP DISCUSS/DSCN MKR DOCD: CPT | Performed by: INTERNAL MEDICINE

## 2023-11-22 PROCEDURE — G8484 FLU IMMUNIZE NO ADMIN: HCPCS | Performed by: INTERNAL MEDICINE

## 2023-11-22 PROCEDURE — 3074F SYST BP LT 130 MM HG: CPT | Performed by: INTERNAL MEDICINE

## 2023-11-22 NOTE — PROGRESS NOTES
accessory muscle use. HEENT: Atraumatic, normocephalic, moist mucosa. Eyes:   No scleral icterus, normal conjunctiva, EOMI  Neck:   No JVD, midline trachea, no accessory muscle use  Chest:   Good bilateral air entry and clear to auscultation bilaterally without wheezes or rales or rhonchi  Cardiac:  Regular rate and rhythm with positive S1 and S2 and no rubs  Abdomen: Soft and non-tender and non-distended with active bowel sounds  :   No suprapubic or flank tenderness. Neuro:   AAO x 3   SKIN:  No rashes, good skin turgor. Extremities:  No edema, palpable peripheral pulses, no calf tenderness. Labs:     No results for input(s): \"WBC\", \"RBC\", \"HGB\", \"HCT\", \"MCV\", \"MCH\", \"MCHC\", \"RDW\", \"PLT\", \"MPV\" in the last 72 hours. BMP: No results for input(s): \"NA\", \"K\", \"CL\", \"CO2\", \"BUN\", \"CREATININE\", \"GLUCOSE\", \"CALCIUM\" in the last 72 hours. Phosphorus:   No results for input(s): \"PHOS\" in the last 72 hours. Magnesium:  No results for input(s): \"MG\" in the last 72 hours. Albumin:  No results for input(s): \"LABALBU\" in the last 72 hours. BNP:    No results found for: \"BNP\"  NEVIN:      Lab Results   Component Value Date/Time    NEVIN NEGATIVE 03/22/2023 02:57 PM     SPEP:  Lab Results   Component Value Date/Time    PROT 6.4 10/30/2023 09:29 AM    ALBCAL 3.8 10/30/2023 09:29 AM    ALBPCT 60 10/30/2023 09:29 AM    LABALPH 0.2 10/30/2023 09:29 AM    LABALPH 0.9 10/30/2023 09:29 AM    A1PCT 3 10/30/2023 09:29 AM    A2PCT 15 10/30/2023 09:29 AM    BETAPCT 10 10/30/2023 09:29 AM    GAMGLOB 0.8 10/30/2023 09:29 AM    GGPCT 12 10/30/2023 09:29 AM    PATH ELECTRONICALLY SIGNED.  Valerie Powell M.D. 10/30/2023 09:29 AM     UPEP:   No results found for: \"LABPE\"  C3:     Lab Results   Component Value Date/Time    C3 185 03/22/2023 02:57 PM     C4:     Lab Results   Component Value Date/Time    C4 32 03/22/2023 02:57 PM     MPO ANCA:     Lab Results   Component Value Date/Time    MPO <0.3 03/22/2023 02:57 PM     PR3 ANCA:

## 2024-05-08 ENCOUNTER — HOSPITAL ENCOUNTER (OUTPATIENT)
Dept: GENERAL RADIOLOGY | Age: 79
Discharge: HOME | End: 2024-05-10
Payer: MEDICARE

## 2024-05-08 DIAGNOSIS — N20.0 NEPHROLITHIASIS: ICD-10-CM

## 2024-05-08 PROCEDURE — 74018 RADEX ABDOMEN 1 VIEW: CPT

## 2024-05-09 NOTE — PROGRESS NOTES
IMPRESSION:  No calculus could be identified overlying the renal shadows.    Assessment/Plan:   1. Nephrolithiasis  - KUB negative.   - Offered annual observation with KUB. Patient wishes to follow-up as needed.     -Patient has no other questions, comments, or concerns.   -They agree with and understand the plan of care.   -The patient was encouraged to call the office or seek emergency care should this change.      Iwona Davey, APRN - CNP

## 2024-05-10 ENCOUNTER — OFFICE VISIT (OUTPATIENT)
Dept: UROLOGY | Age: 79
End: 2024-05-10
Payer: MEDICARE

## 2024-05-10 VITALS
HEIGHT: 60 IN | BODY MASS INDEX: 35.15 KG/M2 | HEART RATE: 78 BPM | DIASTOLIC BLOOD PRESSURE: 70 MMHG | SYSTOLIC BLOOD PRESSURE: 122 MMHG

## 2024-05-10 DIAGNOSIS — N20.0 NEPHROLITHIASIS: Primary | ICD-10-CM

## 2024-05-10 PROCEDURE — G8427 DOCREV CUR MEDS BY ELIG CLIN: HCPCS | Performed by: NURSE PRACTITIONER

## 2024-05-10 PROCEDURE — 99213 OFFICE O/P EST LOW 20 MIN: CPT | Performed by: NURSE PRACTITIONER

## 2024-05-10 PROCEDURE — 1036F TOBACCO NON-USER: CPT | Performed by: NURSE PRACTITIONER

## 2024-05-10 PROCEDURE — 1123F ACP DISCUSS/DSCN MKR DOCD: CPT | Performed by: NURSE PRACTITIONER

## 2024-05-10 PROCEDURE — G8417 CALC BMI ABV UP PARAM F/U: HCPCS | Performed by: NURSE PRACTITIONER

## 2024-05-10 PROCEDURE — G8400 PT W/DXA NO RESULTS DOC: HCPCS | Performed by: NURSE PRACTITIONER

## 2024-05-10 PROCEDURE — 1090F PRES/ABSN URINE INCON ASSESS: CPT | Performed by: NURSE PRACTITIONER

## 2024-05-10 RX ORDER — SACUBITRIL AND VALSARTAN 24; 26 MG/1; MG/1
1 TABLET, FILM COATED ORAL 2 TIMES DAILY
COMMUNITY

## 2024-05-10 RX ORDER — SPIRONOLACTONE 25 MG/1
12.5 TABLET ORAL DAILY
COMMUNITY
Start: 2024-04-26

## 2024-05-20 ENCOUNTER — HOSPITAL ENCOUNTER (OUTPATIENT)
Age: 79
Discharge: HOME | End: 2024-05-20
Payer: MEDICARE

## 2024-05-20 DIAGNOSIS — R80.9 MICROALBUMINURIA: ICD-10-CM

## 2024-05-20 DIAGNOSIS — D89.2 PARAPROTEINEMIA: ICD-10-CM

## 2024-05-20 DIAGNOSIS — N20.0 NEPHROLITHIASIS: ICD-10-CM

## 2024-05-20 DIAGNOSIS — N18.2 CKD (CHRONIC KIDNEY DISEASE), STAGE II: ICD-10-CM

## 2024-05-20 DIAGNOSIS — I10 PRIMARY HYPERTENSION: ICD-10-CM

## 2024-05-20 LAB
ALBUMIN SERPL-MCNC: 4.3 G/DL (ref 3.5–5.2)
ANION GAP SERPL CALCULATED.3IONS-SCNC: 11 MMOL/L (ref 9–17)
BASOPHILS # BLD: 0.05 K/UL (ref 0–0.2)
BASOPHILS NFR BLD: 1 % (ref 0–2)
BUN SERPL-MCNC: 22 MG/DL (ref 8–23)
BUN/CREAT SERPL: 18 (ref 9–20)
CALCIUM SERPL-MCNC: 9.2 MG/DL (ref 8.6–10.4)
CHLORIDE SERPL-SCNC: 104 MMOL/L (ref 98–107)
CO2 SERPL-SCNC: 26 MMOL/L (ref 20–31)
CREAT SERPL-MCNC: 1.2 MG/DL (ref 0.5–0.9)
CREAT UR-MCNC: 133 MG/DL (ref 28–217)
EOSINOPHIL # BLD: 0.16 K/UL (ref 0–0.44)
EOSINOPHILS RELATIVE PERCENT: 2 % (ref 1–4)
ERYTHROCYTE [DISTWIDTH] IN BLOOD BY AUTOMATED COUNT: 15.1 % (ref 11.8–14.4)
GFR, ESTIMATED: 47 ML/MIN/1.73M2
GLUCOSE SERPL-MCNC: 100 MG/DL (ref 70–99)
HCT VFR BLD AUTO: 47.5 % (ref 36.3–47.1)
HGB BLD-MCNC: 15.4 G/DL (ref 11.9–15.1)
IMM GRANULOCYTES # BLD AUTO: <0.03 K/UL (ref 0–0.3)
IMM GRANULOCYTES NFR BLD: 0 %
LYMPHOCYTES NFR BLD: 2.76 K/UL (ref 1.1–3.7)
LYMPHOCYTES RELATIVE PERCENT: 36 % (ref 24–43)
MCH RBC QN AUTO: 29.9 PG (ref 25.2–33.5)
MCHC RBC AUTO-ENTMCNC: 32.4 G/DL (ref 25.2–33.5)
MCV RBC AUTO: 92.2 FL (ref 82.6–102.9)
MICROALBUMIN UR-MCNC: 49 MG/L (ref 0–20)
MICROALBUMIN/CREAT UR-RTO: 37 MCG/MG CREAT (ref 0–25)
MONOCYTES NFR BLD: 0.48 K/UL (ref 0.1–1.2)
MONOCYTES NFR BLD: 6 % (ref 3–12)
NEUTROPHILS NFR BLD: 55 % (ref 36–65)
NEUTS SEG NFR BLD: 4.21 K/UL (ref 1.5–8.1)
NRBC BLD-RTO: 0 PER 100 WBC
PLATELET # BLD AUTO: 233 K/UL (ref 138–453)
PMV BLD AUTO: 11.3 FL (ref 8.1–13.5)
POTASSIUM SERPL-SCNC: 4.6 MMOL/L (ref 3.7–5.3)
RBC # BLD AUTO: 5.15 M/UL (ref 3.95–5.11)
RBC # BLD: ABNORMAL 10*6/UL
SODIUM SERPL-SCNC: 141 MMOL/L (ref 135–144)
WBC OTHER # BLD: 7.7 K/UL (ref 3.5–11.3)

## 2024-05-20 PROCEDURE — 86334 IMMUNOFIX E-PHORESIS SERUM: CPT

## 2024-05-20 PROCEDURE — 82043 UR ALBUMIN QUANTITATIVE: CPT

## 2024-05-20 PROCEDURE — 82040 ASSAY OF SERUM ALBUMIN: CPT

## 2024-05-20 PROCEDURE — 84165 PROTEIN E-PHORESIS SERUM: CPT

## 2024-05-20 PROCEDURE — 85025 COMPLETE CBC W/AUTO DIFF WBC: CPT

## 2024-05-20 PROCEDURE — 82570 ASSAY OF URINE CREATININE: CPT

## 2024-05-20 PROCEDURE — 36415 COLL VENOUS BLD VENIPUNCTURE: CPT

## 2024-05-20 PROCEDURE — 80048 BASIC METABOLIC PNL TOTAL CA: CPT

## 2024-05-20 PROCEDURE — 84155 ASSAY OF PROTEIN SERUM: CPT

## 2024-05-21 LAB
ALBUMIN PERCENT: 59 % (ref 45–65)
ALBUMIN SERPL-MCNC: 4 G/DL (ref 3.2–5.2)
ALPHA 2 PERCENT: 13 % (ref 6–13)
ALPHA1 GLOB SERPL ELPH-MCNC: 0.3 G/DL (ref 0.1–0.4)
ALPHA1 GLOB SERPL ELPH-MCNC: 4 % (ref 3–6)
ALPHA2 GLOB SERPL ELPH-MCNC: 0.8 G/DL (ref 0.5–0.9)
B-GLOBULIN SERPL ELPH-MCNC: 0.8 G/DL (ref 0.5–1.1)
B-GLOBULIN SERPL ELPH-MCNC: 12 % (ref 11–19)
GAMMA GLOB SERPL ELPH-MCNC: 0.8 G/DL (ref 0.5–1.5)
GAMMA GLOBULIN %: 12 % (ref 9–20)
ITYP INTERPRETATION: NORMAL
PATH REV: NORMAL
PATHOLOGIST: NORMAL
PROT PATTERN SERPL ELPH-IMP: NORMAL
PROT SERPL-MCNC: 6.7 G/DL (ref 6.6–8.7)
TOTAL PROT. SUM,%: 100 % (ref 98–102)
TOTAL PROT. SUM: 6.7 G/DL (ref 6.3–8.2)

## 2024-05-22 ENCOUNTER — OFFICE VISIT (OUTPATIENT)
Dept: NEPHROLOGY | Age: 79
End: 2024-05-22
Payer: MEDICARE

## 2024-05-22 VITALS
HEIGHT: 61 IN | BODY MASS INDEX: 34.55 KG/M2 | SYSTOLIC BLOOD PRESSURE: 128 MMHG | WEIGHT: 183 LBS | HEART RATE: 74 BPM | RESPIRATION RATE: 14 BRPM | DIASTOLIC BLOOD PRESSURE: 72 MMHG

## 2024-05-22 DIAGNOSIS — R80.9 MICROALBUMINURIA: Primary | ICD-10-CM

## 2024-05-22 DIAGNOSIS — N20.0 NEPHROLITHIASIS: ICD-10-CM

## 2024-05-22 DIAGNOSIS — I10 PRIMARY HYPERTENSION: ICD-10-CM

## 2024-05-22 DIAGNOSIS — N18.31 STAGE 3A CHRONIC KIDNEY DISEASE (HCC): ICD-10-CM

## 2024-05-22 PROCEDURE — 1123F ACP DISCUSS/DSCN MKR DOCD: CPT | Performed by: INTERNAL MEDICINE

## 2024-05-22 PROCEDURE — 99213 OFFICE O/P EST LOW 20 MIN: CPT | Performed by: INTERNAL MEDICINE

## 2024-05-22 PROCEDURE — G8417 CALC BMI ABV UP PARAM F/U: HCPCS | Performed by: INTERNAL MEDICINE

## 2024-05-22 PROCEDURE — G8400 PT W/DXA NO RESULTS DOC: HCPCS | Performed by: INTERNAL MEDICINE

## 2024-05-22 PROCEDURE — 1036F TOBACCO NON-USER: CPT | Performed by: INTERNAL MEDICINE

## 2024-05-22 PROCEDURE — 3078F DIAST BP <80 MM HG: CPT | Performed by: INTERNAL MEDICINE

## 2024-05-22 PROCEDURE — G8427 DOCREV CUR MEDS BY ELIG CLIN: HCPCS | Performed by: INTERNAL MEDICINE

## 2024-05-22 PROCEDURE — 1090F PRES/ABSN URINE INCON ASSESS: CPT | Performed by: INTERNAL MEDICINE

## 2024-05-22 PROCEDURE — 3074F SYST BP LT 130 MM HG: CPT | Performed by: INTERNAL MEDICINE

## 2024-05-22 NOTE — PROGRESS NOTES
hours.  BNP:    No results found for: \"BNP\"  NEVIN:      Lab Results   Component Value Date/Time    NEVIN NEGATIVE 03/22/2023 02:57 PM     SPEP:  Lab Results   Component Value Date/Time    ALBCAL 4.0 05/20/2024 12:59 PM    ALBPCT 59 05/20/2024 12:59 PM    LABALPH 0.3 05/20/2024 12:59 PM    LABALPH 0.8 05/20/2024 12:59 PM    A1PCT 4 05/20/2024 12:59 PM    A2PCT 13 05/20/2024 12:59 PM    BETAPCT 12 05/20/2024 12:59 PM    GAMGLOB 0.8 05/20/2024 12:59 PM    GGPCT 12 05/20/2024 12:59 PM    PATH ELECTRONICALLY SIGNED. PANDA CALL M.D. 05/20/2024 12:59 PM     UPEP:   No results found for: \"LABPE\"  C3:     Lab Results   Component Value Date/Time    C3 185 03/22/2023 02:57 PM     C4:     Lab Results   Component Value Date/Time    C4 32 03/22/2023 02:57 PM     MPO ANCA:     Lab Results   Component Value Date/Time    MPO <0.3 03/22/2023 02:57 PM     PR3 ANCA:     Lab Results   Component Value Date/Time    PR3 <0.7 03/22/2023 02:57 PM     Anti-GBM:   No results found for: \"GBMABIGG\"  Hep BsAg:       No results found for: \"HEPBSAG\"  Hep C AB:        No results found for: \"HEPCAB\"    Urinalysis/Chemistries:      Lab Results   Component Value Date/Time    NITRU NEGATIVE 03/22/2023 03:15 PM    COLORU Yellow 03/22/2023 03:15 PM    PHUR 6.0 03/22/2023 03:15 PM    WBCUA TOO NUMEROUS TO COUNT 03/22/2023 03:15 PM    RBCUA 0 TO 2 03/22/2023 03:15 PM    BACTERIA MANY 03/22/2023 03:15 PM    LEUKOCYTESUR 2+ 03/22/2023 03:15 PM    UROBILINOGEN Normal 03/22/2023 03:15 PM    BILIRUBINUR NEGATIVE 03/22/2023 03:15 PM    GLUCOSEU 3+ 03/22/2023 03:15 PM    KETUA NEGATIVE 03/22/2023 03:15 PM     Urine Sodium:   No components found for: \"KATIE\"  Urine Potassium:  No results found for: \"KUR\"  Urine Chloride:  No results found for: \"CLUR\"  Urine Osmolarity: No results found for: \"OSMOU\"  Urine Protein:   No results found for: \"TPU\"  Urine Creatinine:     No results found for: \"LABCREA\"    UPC:     Urine Eosinophils:  No components found for:

## 2024-11-23 ENCOUNTER — HOSPITAL ENCOUNTER (OUTPATIENT)
Age: 79
Discharge: HOME | End: 2024-11-23
Payer: MEDICARE

## 2024-11-23 DIAGNOSIS — I10 PRIMARY HYPERTENSION: ICD-10-CM

## 2024-11-23 DIAGNOSIS — N20.0 NEPHROLITHIASIS: ICD-10-CM

## 2024-11-23 DIAGNOSIS — N18.31 STAGE 3A CHRONIC KIDNEY DISEASE (HCC): ICD-10-CM

## 2024-11-23 DIAGNOSIS — R80.9 MICROALBUMINURIA: ICD-10-CM

## 2024-11-23 LAB
ANION GAP SERPL CALCULATED.3IONS-SCNC: 9 MMOL/L (ref 9–17)
BUN SERPL-MCNC: 28 MG/DL (ref 8–23)
BUN/CREAT SERPL: 22 (ref 9–20)
CALCIUM SERPL-MCNC: 9.6 MG/DL (ref 8.6–10.4)
CHLORIDE SERPL-SCNC: 105 MMOL/L (ref 98–107)
CO2 SERPL-SCNC: 29 MMOL/L (ref 20–31)
CREAT SERPL-MCNC: 1.3 MG/DL (ref 0.5–0.9)
CREAT UR-MCNC: 115 MG/DL (ref 28–217)
GFR, ESTIMATED: 42 ML/MIN/1.73M2
GLUCOSE SERPL-MCNC: 102 MG/DL (ref 70–99)
MICROALBUMIN UR-MCNC: 42 MG/L (ref 0–20)
MICROALBUMIN/CREAT UR-RTO: 37 MCG/MG CREAT (ref 0–25)
POTASSIUM SERPL-SCNC: 4.7 MMOL/L (ref 3.7–5.3)
SODIUM SERPL-SCNC: 143 MMOL/L (ref 135–144)

## 2024-11-23 PROCEDURE — 82570 ASSAY OF URINE CREATININE: CPT

## 2024-11-23 PROCEDURE — 82043 UR ALBUMIN QUANTITATIVE: CPT

## 2024-11-23 PROCEDURE — 36415 COLL VENOUS BLD VENIPUNCTURE: CPT

## 2024-11-23 PROCEDURE — 80048 BASIC METABOLIC PNL TOTAL CA: CPT

## 2024-11-27 ENCOUNTER — OFFICE VISIT (OUTPATIENT)
Dept: NEPHROLOGY | Age: 79
End: 2024-11-27

## 2024-11-27 VITALS
RESPIRATION RATE: 16 BRPM | DIASTOLIC BLOOD PRESSURE: 74 MMHG | WEIGHT: 183 LBS | HEIGHT: 61 IN | SYSTOLIC BLOOD PRESSURE: 122 MMHG | OXYGEN SATURATION: 96 % | BODY MASS INDEX: 34.55 KG/M2 | HEART RATE: 65 BPM

## 2024-11-27 DIAGNOSIS — R80.9 MICROALBUMINURIA: ICD-10-CM

## 2024-11-27 DIAGNOSIS — N18.32 STAGE 3B CHRONIC KIDNEY DISEASE (HCC): ICD-10-CM

## 2024-11-27 DIAGNOSIS — I10 PRIMARY HYPERTENSION: Primary | ICD-10-CM

## 2024-11-27 NOTE — PROGRESS NOTES
Renal Progress Note    Patient :  Larissa Nichole; 79 y.o. MRN# [id number]    Reason for Consult:     Asked by Milvia Del Castillo to see for JOSE/Elevated Creatinine.    History of Present Illness:     Larissa Nichole; 79 y.o. female with past medical history as mentioned below.  The patient comes back on 11/28/2024 in 6-month return visit.  Has a history of a very low-grade paraproteinemia but the check from May of 2024 showed imminent typing negative for monoclonal immunoglobulin..    She did have a kidney stone that required lithotripsy in June 2023 by Dr. Scott.  She had a stent placed.  She has had no recurrence.  She was put on sodium citrate.  She also takes calcium tablets as well.      Her recent calcium level is normal at 9.4.  Random urine microalbumin to creatinine ratio she will still is micro albumin, but it is better than previous at 64.      From 11/23/2024 she still has a bit of microalbuminuria 37 with serum creatinine relatively stable at 1.3 compared to 1.2 in May 2024.  Electrolytes within normal limits with sodium 143 potassium 4.7 chloride 105 and CO2 29 and BUN 28 with estimated GFR of 42 mL/min.    She feels well today.      Medications include low-dose aspirin, Lipitor, BIOflex, calcium,  Jardiance 10 mg a day, Synthroid 125 mcg daily, magnesium, Lopressor 50 mg twice a day, multivitamin, fish oil, Entresto twice daily and spironolactone 12.5 mg a day.  She is off Atacand.  Her Entresto dose was increased which is likely correlated with some increase in creatinine and lower microalbuminuria.    From 5/20/2024 the patient has minimal microalbuminuria at 37 mcg/mg of creatinine.  Serum protein electrophoresis negative for monoclonal immunoglobulin.  Albumin is 4.3.  Sodium 141 with potassium 4.6 chloride 104 and CO2 26, BUN 22 and creatinine 1.2 for estimated GFR 47 mL/min.  CBC shows white blood cell 7.7 with hemoglobin 15.4 and platelets 233.    She does note that she had an uncontrolled hypertension

## 2025-05-15 ENCOUNTER — HOSPITAL ENCOUNTER (OUTPATIENT)
Age: 80
Discharge: HOME | End: 2025-05-15
Payer: MEDICARE

## 2025-05-15 DIAGNOSIS — R80.9 MICROALBUMINURIA: ICD-10-CM

## 2025-05-15 DIAGNOSIS — I10 PRIMARY HYPERTENSION: ICD-10-CM

## 2025-05-15 DIAGNOSIS — N18.32 STAGE 3B CHRONIC KIDNEY DISEASE (HCC): ICD-10-CM

## 2025-05-15 LAB
25(OH)D3 SERPL-MCNC: 29 NG/ML (ref 30–100)
ALBUMIN SERPL-MCNC: 4 G/DL (ref 3.5–5.2)
ALBUMIN/GLOB SERPL: 1.3 {RATIO} (ref 1–2.5)
ALP SERPL-CCNC: 61 U/L (ref 35–104)
ALT SERPL-CCNC: 15 U/L (ref 5–33)
ANION GAP SERPL CALCULATED.3IONS-SCNC: 9 MMOL/L (ref 9–17)
AST SERPL-CCNC: 17 U/L
BASOPHILS # BLD: 0.05 K/UL (ref 0–0.2)
BASOPHILS NFR BLD: 1 % (ref 0–2)
BILIRUB DIRECT SERPL-MCNC: 0.2 MG/DL (ref 0–0.2)
BILIRUB INDIRECT SERPL-MCNC: 0.1 MG/DL (ref 0–1)
BILIRUB SERPL-MCNC: 0.3 MG/DL (ref 0.3–1.2)
BUN SERPL-MCNC: 30 MG/DL (ref 8–23)
CALCIUM SERPL-MCNC: 9.3 MG/DL (ref 8.6–10.4)
CHLORIDE SERPL-SCNC: 107 MMOL/L (ref 98–107)
CO2 SERPL-SCNC: 27 MMOL/L (ref 20–31)
CREAT SERPL-MCNC: 1.3 MG/DL (ref 0.5–0.9)
CREAT UR-MCNC: 100 MG/DL (ref 28–217)
EOSINOPHIL # BLD: 0.12 K/UL (ref 0–0.44)
EOSINOPHILS RELATIVE PERCENT: 2 % (ref 1–4)
ERYTHROCYTE [DISTWIDTH] IN BLOOD BY AUTOMATED COUNT: 14 % (ref 11.8–14.4)
GFR, ESTIMATED: 42 ML/MIN/1.73M2
GLUCOSE SERPL-MCNC: 103 MG/DL (ref 70–99)
HCT VFR BLD AUTO: 48.8 % (ref 36.3–47.1)
HGB BLD-MCNC: 15.3 G/DL (ref 11.9–15.1)
IMM GRANULOCYTES # BLD AUTO: <0.03 K/UL (ref 0–0.3)
IMM GRANULOCYTES NFR BLD: 0 %
LYMPHOCYTES NFR BLD: 2.46 K/UL (ref 1.1–3.7)
LYMPHOCYTES RELATIVE PERCENT: 37 % (ref 24–43)
MCH RBC QN AUTO: 30.2 PG (ref 25.2–33.5)
MCHC RBC AUTO-ENTMCNC: 31.4 G/DL (ref 25.2–33.5)
MCV RBC AUTO: 96.3 FL (ref 82.6–102.9)
MONOCYTES NFR BLD: 0.38 K/UL (ref 0.1–1.2)
MONOCYTES NFR BLD: 6 % (ref 3–12)
NEUTROPHILS NFR BLD: 54 % (ref 36–65)
NEUTS SEG NFR BLD: 3.61 K/UL (ref 1.5–8.1)
NRBC BLD-RTO: 0 PER 100 WBC
PHOSPHATE SERPL-MCNC: 4 MG/DL (ref 2.6–4.5)
PLATELET # BLD AUTO: 229 K/UL (ref 138–453)
PMV BLD AUTO: 10.6 FL (ref 8.1–13.5)
POTASSIUM SERPL-SCNC: 4.5 MMOL/L (ref 3.7–5.3)
PROT SERPL-MCNC: 7.1 G/DL (ref 6.4–8.3)
PTH-INTACT SERPL-MCNC: 37 PG/ML (ref 17.9–58.6)
RBC # BLD AUTO: 5.07 M/UL (ref 3.95–5.11)
SODIUM SERPL-SCNC: 143 MMOL/L (ref 135–144)
TOTAL PROTEIN, URINE: 16 MG/DL
URATE SERPL-MCNC: 3.9 MG/DL (ref 2.4–5.7)
WBC OTHER # BLD: 6.6 K/UL (ref 3.5–11.3)

## 2025-05-15 PROCEDURE — 84100 ASSAY OF PHOSPHORUS: CPT

## 2025-05-15 PROCEDURE — 82570 ASSAY OF URINE CREATININE: CPT

## 2025-05-15 PROCEDURE — 84156 ASSAY OF PROTEIN URINE: CPT

## 2025-05-15 PROCEDURE — 84550 ASSAY OF BLOOD/URIC ACID: CPT

## 2025-05-15 PROCEDURE — 82306 VITAMIN D 25 HYDROXY: CPT

## 2025-05-15 PROCEDURE — 80053 COMPREHEN METABOLIC PANEL: CPT

## 2025-05-15 PROCEDURE — 83970 ASSAY OF PARATHORMONE: CPT

## 2025-05-15 PROCEDURE — 82248 BILIRUBIN DIRECT: CPT

## 2025-05-15 PROCEDURE — 36415 COLL VENOUS BLD VENIPUNCTURE: CPT

## 2025-05-15 PROCEDURE — 85025 COMPLETE CBC W/AUTO DIFF WBC: CPT

## 2025-05-20 ENCOUNTER — OFFICE VISIT (OUTPATIENT)
Dept: NEPHROLOGY | Age: 80
End: 2025-05-20
Payer: MEDICARE

## 2025-05-20 VITALS
BODY MASS INDEX: 34.55 KG/M2 | DIASTOLIC BLOOD PRESSURE: 66 MMHG | HEART RATE: 54 BPM | HEIGHT: 61 IN | WEIGHT: 183 LBS | SYSTOLIC BLOOD PRESSURE: 120 MMHG

## 2025-05-20 DIAGNOSIS — I10 PRIMARY HYPERTENSION: ICD-10-CM

## 2025-05-20 DIAGNOSIS — N20.0 NEPHROLITHIASIS: ICD-10-CM

## 2025-05-20 DIAGNOSIS — N18.32 STAGE 3B CHRONIC KIDNEY DISEASE (HCC): Primary | ICD-10-CM

## 2025-05-20 DIAGNOSIS — R80.9 MICROALBUMINURIA: ICD-10-CM

## 2025-05-20 PROCEDURE — 1123F ACP DISCUSS/DSCN MKR DOCD: CPT

## 2025-05-20 PROCEDURE — G8417 CALC BMI ABV UP PARAM F/U: HCPCS

## 2025-05-20 PROCEDURE — 1036F TOBACCO NON-USER: CPT

## 2025-05-20 PROCEDURE — 99214 OFFICE O/P EST MOD 30 MIN: CPT

## 2025-05-20 PROCEDURE — 99213 OFFICE O/P EST LOW 20 MIN: CPT

## 2025-05-20 PROCEDURE — G8427 DOCREV CUR MEDS BY ELIG CLIN: HCPCS

## 2025-05-20 PROCEDURE — 3078F DIAST BP <80 MM HG: CPT

## 2025-05-20 PROCEDURE — 3074F SYST BP LT 130 MM HG: CPT

## 2025-05-20 PROCEDURE — G8400 PT W/DXA NO RESULTS DOC: HCPCS

## 2025-05-20 PROCEDURE — 1090F PRES/ABSN URINE INCON ASSESS: CPT

## 2025-05-20 PROCEDURE — 1159F MED LIST DOCD IN RCRD: CPT

## 2025-05-20 RX ORDER — POTASSIUM CHLORIDE 750 MG/1
10 CAPSULE, EXTENDED RELEASE ORAL 2 TIMES DAILY
COMMUNITY

## 2025-05-20 RX ORDER — PHENOL 1.4 %
10 AEROSOL, SPRAY (ML) MUCOUS MEMBRANE NIGHTLY PRN
COMMUNITY

## 2025-05-22 NOTE — PROGRESS NOTES
Nephrology Progress Note    Larissa Prattkylie Del Castillo, Milvia L, APRN - CNP      SUBJECTIVE      Chief Complaint   Patient presents with    Hypertension     6 month with labs     5/20/2025:  Patient presents for follow-up visit of CKD.  Previously she was following with Dr. Todd.  She has history of CKD, hypertension, nephrolithiasis in 2023 requiring lithotripsy, history of ureteral stent follows with Dr. Barron, known thyroid removal at the age of 25.    Since last visit she reports doing well.  No acute illnesses or hospitalization.  No recent passing of stones.  She has been using [employer] and reporting her weights and blood pressure daily which has helped her be consistent with health maintenance.  Medication wise she was on Farxiga in the past however currently she is on Jardiance Entresto Aldactone and metoprolol.  She denies hematuria dysuria although she does admit to nocturia about 3 times nightly.  Most recent labs sodium 143, Tessman 4.5, chloride 107, bicarb 27, BUN 30, creatinine 1.3, calcium 9.3, albumin 4.0, PTH 37, vitamin D 29, hemoglobin 15.3, UPC 0.13     Pt denies any hx of heavy or prolonged NSAID use and there is no history of OTC herbal medications . No history of dysuria or frequency.  Pt denies any hx of recent UTI , incontinence or nocturia or recurrent nephrolithiasis. No unusual skin rashes . No tea coloured urine .No recent procedures involving IV contrast.     Patient Active Problem List    Diagnosis Date Noted    Mass of left hand 10/18/2022     Priority: Medium         CURRENT MEDICATIONS      Current Outpatient Medications   Medication Sig Dispense Refill    potassium chloride (MICRO-K) 10 MEQ extended release capsule Take 1 capsule by mouth 2 times daily      Glucosamine-Chondroitin-MSM (GLUCOSAMINE CHONDROIT MSM DS PO) Take by mouth      Melatonin 10 MG TABS Take 10 mg by mouth nightly as needed      Psyllium (METAMUCIL PO) Take by mouth      sacubitril-valsartan